# Patient Record
Sex: MALE | Race: WHITE | Employment: OTHER | URBAN - NONMETROPOLITAN AREA
[De-identification: names, ages, dates, MRNs, and addresses within clinical notes are randomized per-mention and may not be internally consistent; named-entity substitution may affect disease eponyms.]

---

## 2018-04-14 ENCOUNTER — HOSPITAL ENCOUNTER (OUTPATIENT)
Age: 68
Setting detail: SPECIMEN
Discharge: HOME | End: 2018-04-14
Payer: MEDICARE

## 2018-04-14 PROCEDURE — 89055 LEUKOCYTE ASSESSMENT FECAL: CPT

## 2018-04-14 PROCEDURE — 87045 FECES CULTURE AEROBIC BACT: CPT

## 2018-04-14 PROCEDURE — 87329 GIARDIA AG IA: CPT

## 2018-04-15 LAB — WHITE BLOOD CELLS (WBC), STOOL: NORMAL

## 2018-04-16 ENCOUNTER — HOSPITAL ENCOUNTER (OUTPATIENT)
Age: 68
Setting detail: SPECIMEN
Discharge: HOME | End: 2018-04-16
Payer: MEDICARE

## 2018-04-16 LAB
ALBUMIN SERPL-MCNC: 4.4 G/DL (ref 3.5–5.2)
ALP BLD-CCNC: 36 U/L (ref 40–129)
ALT SERPL-CCNC: 24 U/L (ref 0–40)
AMYLASE: 51 U/L (ref 20–100)
ANION GAP SERPL CALCULATED.3IONS-SCNC: 14 MMOL/L (ref 7–16)
AST SERPL-CCNC: 18 U/L (ref 0–39)
BILIRUB SERPL-MCNC: 1.2 MG/DL (ref 0–1.2)
BUN BLDV-MCNC: 22 MG/DL (ref 8–23)
CALCIUM SERPL-MCNC: 9.6 MG/DL (ref 8.6–10.2)
CHLORIDE BLD-SCNC: 103 MMOL/L (ref 98–107)
CO2: 27 MMOL/L (ref 22–29)
CREAT SERPL-MCNC: 0.9 MG/DL (ref 0.7–1.2)
GFR AFRICAN AMERICAN: >60
GFR NON-AFRICAN AMERICAN: >60 ML/MIN/1.73
GIARDIA ANTIGEN STOOL: NORMAL
GLUCOSE BLD-MCNC: 108 MG/DL (ref 74–109)
HCT VFR BLD CALC: 46.6 % (ref 37–54)
HEMOGLOBIN: 15.4 G/DL (ref 12.5–16.5)
LIPASE: 16 U/L (ref 13–60)
MCH RBC QN AUTO: 29.4 PG (ref 26–35)
MCHC RBC AUTO-ENTMCNC: 33 % (ref 32–34.5)
MCV RBC AUTO: 89.1 FL (ref 80–99.9)
PDW BLD-RTO: 13.1 FL (ref 11.5–15)
PLATELET # BLD: 244 E9/L (ref 130–450)
PMV BLD AUTO: 11.6 FL (ref 7–12)
POTASSIUM SERPL-SCNC: 4.3 MMOL/L (ref 3.5–5)
RBC # BLD: 5.23 E12/L (ref 3.8–5.8)
SODIUM BLD-SCNC: 144 MMOL/L (ref 132–146)
TOTAL PROTEIN: 7.7 G/DL (ref 6.4–8.3)
WBC # BLD: 7.4 E9/L (ref 4.5–11.5)

## 2018-04-16 PROCEDURE — 85027 COMPLETE CBC AUTOMATED: CPT

## 2018-04-16 PROCEDURE — 36415 COLL VENOUS BLD VENIPUNCTURE: CPT

## 2018-04-16 PROCEDURE — 83690 ASSAY OF LIPASE: CPT

## 2018-04-16 PROCEDURE — 80053 COMPREHEN METABOLIC PANEL: CPT

## 2018-04-16 PROCEDURE — 82150 ASSAY OF AMYLASE: CPT

## 2018-04-17 LAB — CULTURE, STOOL: NORMAL

## 2020-01-24 VITALS
BODY MASS INDEX: 24.05 KG/M2 | HEIGHT: 70 IN | DIASTOLIC BLOOD PRESSURE: 100 MMHG | SYSTOLIC BLOOD PRESSURE: 152 MMHG | HEART RATE: 52 BPM | WEIGHT: 168 LBS

## 2020-02-06 NOTE — PROGRESS NOTES
mouth 2 times daily   11    Multiple Vitamins-Minerals (PRESERVISION AREDS 2) CAPS Take 1 capsule by mouth Daily   12    hydrochlorothiazide (MICROZIDE) 12.5 MG capsule Take 12.5 mg by mouth every morning   12    esomeprazole (NEXIUM) 40 MG delayed release capsule Take 40 mg by mouth every morning (before breakfast)   12    Lidocaine-Hydrocortisone Ace (CEZAR-MICHEL) 2-2 % KIT Place rectally daily as needed      clobetasol (TEMOVATE) 0.05 % cream Apply topically 2 times daily as needed Apply topically 2 times daily.  diphenhydrAMINE (BENADRYL) 50 MG capsule Take 50 mg by mouth every 6 hours as needed for Itching       metoprolol (LOPRESSOR) 25 MG tablet Take 1 tablet by mouth 2 times daily. 60 tablet 0    tamsulosin (FLOMAX) 0.4 MG capsule Take 1 capsule by mouth daily. 30 capsule 0    loratadine (CLARITIN) 10 MG tablet Take 10 mg by mouth daily.  meloxicam (MOBIC) 15 MG tablet Take 15 mg by mouth daily.  Calcium Polycarbophil (FIBER) 625 MG TABS Take 1 tablet by mouth 2 times daily (with meals). No current facility-administered medications for this visit.           Allergies as of 2020 - Review Complete 2020   Allergen Reaction Noted    Bee venom  2015       Social History     Socioeconomic History    Marital status: Single     Spouse name: Not on file    Number of children: Not on file    Years of education: Not on file    Highest education level: Not on file   Occupational History    Not on file   Social Needs    Financial resource strain: Not on file    Food insecurity:     Worry: Not on file     Inability: Not on file    Transportation needs:     Medical: Not on file     Non-medical: Not on file   Tobacco Use    Smoking status: Former Smoker     Last attempt to quit: 1995     Years since quittin.6    Smokeless tobacco: Never Used   Substance and Sexual Activity    Alcohol use: No     Comment: No caffeine     Drug use: No    Sexual activity: Not Currently   Lifestyle    Physical activity:     Days per week: Not on file     Minutes per session: Not on file    Stress: Not on file   Relationships    Social connections:     Talks on phone: Not on file     Gets together: Not on file     Attends [employer] service: Not on file     Active member of club or organization: Not on file     Attends meetings of clubs or organizations: Not on file     Relationship status: Not on file    Intimate partner violence:     Fear of current or ex partner: Not on file     Emotionally abused: Not on file     Physically abused: Not on file     Forced sexual activity: Not on file   Other Topics Concern    Not on file   Social History Narrative    Not on file     No family history for early CAD    REVIEW OF SYSTEMS:     CONSTITUTIONAL:  negative for  fevers, chills, sweats and fatigue  HEENT:  negative for  tinnitus, earaches, nasal congestion and epistaxis  RESPIRATORY:  negative for  dry cough, cough with sputum, dyspnea, wheezing and hemoptysis  GASTROINTESTINAL:  negative for nausea, vomiting, diarrhea, constipation, pruritus and jaundice  HEMATOLOGIC/LYMPHATIC:  negative for easy bruising, bleeding, lymphadenopathy and petechiae  ENDOCRINE:  negative for heat intolerance, cold intolerance, tremor, hair loss and diabetic symptoms including neither polyuria nor polydipsia nor blurred vision  MUSCULOSKELETAL:  negative for  myalgias, arthralgias, joint swelling, stiff joints and decreased range of motion  NEUROLOGICAL:  negative for memory problems, speech problems, visual disturbance, dysphagia, weakness and numbness      PHYSICAL EXAM:   Constitutional:  Awake, alert cooperative, no apparent distress, and appears stated age. HEENT:  Moist and pink mucous membranes, normocephalic, without obvious abnormality, atraumatic, normal ears and nose.    NECK:  Supple, symmetrical, trachea midline, no JVD, no adenopathy, thyroid symmetric, not enlarged and no tenderness, good carotid Follow-up with Dr. Linda Cyr in 1 year, sooner if symptomatic for any reason    I have reviewed my findings and recommendations with patient    Electronically signed by Meche Hopper MD on 2/11/2020 at 10:31 AM  NOTE: This report was transcribed using voice recognition software.  Every effort was made to ensure accuracy; however, inadvertent computerized transcription errors may be present

## 2020-02-11 ENCOUNTER — OFFICE VISIT (OUTPATIENT)
Dept: CARDIOLOGY CLINIC | Age: 70
End: 2020-02-11
Payer: MEDICARE

## 2020-02-11 VITALS
SYSTOLIC BLOOD PRESSURE: 132 MMHG | HEIGHT: 70 IN | WEIGHT: 173 LBS | HEART RATE: 63 BPM | BODY MASS INDEX: 24.77 KG/M2 | DIASTOLIC BLOOD PRESSURE: 68 MMHG

## 2020-02-11 PROCEDURE — 93000 ELECTROCARDIOGRAM COMPLETE: CPT | Performed by: INTERNAL MEDICINE

## 2020-02-11 PROCEDURE — 99213 OFFICE O/P EST LOW 20 MIN: CPT | Performed by: INTERNAL MEDICINE

## 2020-02-11 RX ORDER — FLUTICASONE PROPIONATE 50 MCG
2 SPRAY, SUSPENSION (ML) NASAL DAILY
COMMUNITY

## 2020-02-11 RX ORDER — SUCRALFATE 1 G/1
1 TABLET ORAL 4 TIMES DAILY
COMMUNITY

## 2020-09-01 ENCOUNTER — OFFICE VISIT (OUTPATIENT)
Dept: CARDIOLOGY CLINIC | Age: 70
End: 2020-09-01
Payer: MEDICARE

## 2020-09-01 VITALS
BODY MASS INDEX: 24.48 KG/M2 | SYSTOLIC BLOOD PRESSURE: 112 MMHG | RESPIRATION RATE: 16 BRPM | HEART RATE: 75 BPM | DIASTOLIC BLOOD PRESSURE: 78 MMHG | HEIGHT: 70 IN | WEIGHT: 171 LBS

## 2020-09-01 PROCEDURE — 93000 ELECTROCARDIOGRAM COMPLETE: CPT | Performed by: INTERNAL MEDICINE

## 2020-09-01 PROCEDURE — 99214 OFFICE O/P EST MOD 30 MIN: CPT | Performed by: INTERNAL MEDICINE

## 2020-09-04 ENCOUNTER — HOSPITAL ENCOUNTER (OUTPATIENT)
Age: 70
Discharge: HOME | End: 2020-09-06
Payer: MEDICARE

## 2020-09-04 PROCEDURE — U0003 INFECTIOUS AGENT DETECTION BY NUCLEIC ACID (DNA OR RNA); SEVERE ACUTE RESPIRATORY SYNDROME CORONAVIRUS 2 (SARS-COV-2) (CORONAVIRUS DISEASE [COVID-19]), AMPLIFIED PROBE TECHNIQUE, MAKING USE OF HIGH THROUGHPUT TECHNOLOGIES AS DESCRIBED BY CMS-2020-01-R: HCPCS

## 2020-09-09 LAB
SARS-COV-2: NOT DETECTED
SOURCE: NORMAL

## 2020-09-09 NOTE — PROGRESS NOTES
explained no results of covid test yet; asked for  to come to [facility] tomorrow for a rapid test before echo.   cg verbalized understanding

## 2020-09-09 NOTE — PROGRESS NOTES
[address]                                                                                                                     PRE OP INSTRUCTIONS FOR  Sergio Dexter        Date: 9/9/2020    Date and time of surgery: 9/10/20   Arrival Time: 1000    1. Do not eat or drink anything after 12 midnight prior to surgery. This includes no water, chewing gum, mints or ice chips. 2. Take the following pills with a small sip of water on the morning of Surgery: Metoprolol      3. Diabetics may take evening dose of insulin but none after midnight. If you feel symptomatic or low blood sugar take 1-2 ounces of apple juice only. 4. Aspirin, Ibuprofen, Advil, Naproxen, Vitamin E and other Anti-inflammatory products should be stopped  before surgery  as directed by your physician. 5. Check with your Doctor regarding stopping Plavix, Coumadin, Lovenox, Fragmin or other blood thinners. 6. Do not smoke,use illicit drugs and do not drink any alcoholic beverages 24 hours prior to surgery. 7. You may brush your teeth and gargle the morning of surgery. DO NOT SWALLOW WATER    8. You MUST make arrangements for a responsible adult to take you home after your surgery. You will not be allowed to leave alone or drive yourself home. It is strongly suggested someone stay with you the first 24 hrs. Your surgery will be cancelled if you do not have a ride home. 9. A parent/legal guardian must accompany a child scheduled for surgery and plan to stay at the hospital until the child is discharged. Please do not bring other children with you. 10. Please wear simple, loose fitting clothing to the hospital.  Judy Crawford not bring valuables (money, credit cards, checkbooks, etc.) Do not wear any makeup (including no eye makeup) or nail polish on your fingers or toes. 11. DO NOT wear any jewelry or piercings on day of surgery. All body piercing jewelry must be removed. 12.  Shower the night before surgery with _X__Antibacterial soap ___Hibiclens. 15. Remember to bring Blood Bank bracelet to the hospital on the day of surgery. 14. If you have a Living Will and Durable Power of  for Healthcare, please bring in a copy. 15. If appropriate bring crutches, inspirex, etc... 12. Notify your Surgeon if you develop any illness between now and surgery time, cough, cold, fever, sore throat, nausea, vomiting, etc.  Please notify your surgeon if you experience dizziness, shortness of breath or blurred vision between now & the time of your surgery. 17. If you have _X__dentures, they will be removed before going to the OR; we will provide you a container. If you wear ___contact lenses or _X__glasses, they will be removed; please bring a case for them. 18. To provide excellent care visitors will be limited to one in the room at any given time. 19. Please bring picture ID and insurance card. 20. Sleep apnea patients need to bring CPAP to hospital on day of surgery. 21. Visit our web site for additional information: [email or web address]    22. During flu season no children under the age of 15 are permitted in the hospital for the safety of all patients. 23. Other Come in through main lobby, go to information desk. Please call pre admission testing if you have any further questions.    [facility]     [address]

## 2020-09-10 ENCOUNTER — HOSPITAL ENCOUNTER (OUTPATIENT)
Age: 70
Setting detail: OUTPATIENT SURGERY
Discharge: OTHER INSTITUTION | End: 2020-09-10
Attending: STUDENT IN AN ORGANIZED HEALTH CARE EDUCATION/TRAINING PROGRAM | Admitting: STUDENT IN AN ORGANIZED HEALTH CARE EDUCATION/TRAINING PROGRAM
Payer: MEDICARE

## 2020-09-10 ENCOUNTER — ANESTHESIA EVENT (OUTPATIENT)
Dept: ENDOSCOPY | Age: 70
End: 2020-09-10
Payer: MEDICARE

## 2020-09-10 ENCOUNTER — ANESTHESIA (OUTPATIENT)
Dept: ENDOSCOPY | Age: 70
End: 2020-09-10
Payer: MEDICARE

## 2020-09-10 VITALS
RESPIRATION RATE: 22 BRPM | DIASTOLIC BLOOD PRESSURE: 83 MMHG | SYSTOLIC BLOOD PRESSURE: 136 MMHG | OXYGEN SATURATION: 91 %

## 2020-09-10 VITALS
TEMPERATURE: 96.9 F | SYSTOLIC BLOOD PRESSURE: 156 MMHG | WEIGHT: 165 LBS | OXYGEN SATURATION: 98 % | BODY MASS INDEX: 24.44 KG/M2 | DIASTOLIC BLOOD PRESSURE: 82 MMHG | RESPIRATION RATE: 16 BRPM | HEIGHT: 69 IN | HEART RATE: 58 BPM

## 2020-09-10 LAB
LV EF: 63 %
LVEF MODALITY: NORMAL

## 2020-09-10 PROCEDURE — 6360000002 HC RX W HCPCS: Performed by: NURSE ANESTHETIST, CERTIFIED REGISTERED

## 2020-09-10 PROCEDURE — 3700000001 HC ADD 15 MINUTES (ANESTHESIA): Performed by: STUDENT IN AN ORGANIZED HEALTH CARE EDUCATION/TRAINING PROGRAM

## 2020-09-10 PROCEDURE — 7100000010 HC PHASE II RECOVERY - FIRST 15 MIN: Performed by: STUDENT IN AN ORGANIZED HEALTH CARE EDUCATION/TRAINING PROGRAM

## 2020-09-10 PROCEDURE — 2580000003 HC RX 258: Performed by: ANESTHESIOLOGY

## 2020-09-10 PROCEDURE — 93325 DOPPLER ECHO COLOR FLOW MAPG: CPT

## 2020-09-10 PROCEDURE — 93312 ECHO TRANSESOPHAGEAL: CPT | Performed by: STUDENT IN AN ORGANIZED HEALTH CARE EDUCATION/TRAINING PROGRAM

## 2020-09-10 PROCEDURE — 93312 ECHO TRANSESOPHAGEAL: CPT

## 2020-09-10 PROCEDURE — 3700000000 HC ANESTHESIA ATTENDED CARE: Performed by: STUDENT IN AN ORGANIZED HEALTH CARE EDUCATION/TRAINING PROGRAM

## 2020-09-10 PROCEDURE — 7100000011 HC PHASE II RECOVERY - ADDTL 15 MIN: Performed by: STUDENT IN AN ORGANIZED HEALTH CARE EDUCATION/TRAINING PROGRAM

## 2020-09-10 PROCEDURE — 2709999900 HC NON-CHARGEABLE SUPPLY: Performed by: STUDENT IN AN ORGANIZED HEALTH CARE EDUCATION/TRAINING PROGRAM

## 2020-09-10 RX ORDER — SODIUM CHLORIDE, SODIUM LACTATE, POTASSIUM CHLORIDE, CALCIUM CHLORIDE 600; 310; 30; 20 MG/100ML; MG/100ML; MG/100ML; MG/100ML
INJECTION, SOLUTION INTRAVENOUS CONTINUOUS
Status: DISCONTINUED | OUTPATIENT
Start: 2020-09-10 | End: 2020-09-10 | Stop reason: HOSPADM

## 2020-09-10 RX ORDER — PROPOFOL 10 MG/ML
INJECTION, EMULSION INTRAVENOUS CONTINUOUS PRN
Status: DISCONTINUED | OUTPATIENT
Start: 2020-09-10 | End: 2020-09-10 | Stop reason: SDUPTHER

## 2020-09-10 RX ADMIN — SODIUM CHLORIDE, POTASSIUM CHLORIDE, SODIUM LACTATE AND CALCIUM CHLORIDE: 600; 310; 30; 20 INJECTION, SOLUTION INTRAVENOUS at 10:05

## 2020-09-10 RX ADMIN — PROPOFOL 100 MCG/KG/MIN: 10 INJECTION, EMULSION INTRAVENOUS at 11:45

## 2020-09-10 ASSESSMENT — PAIN SCALES - GENERAL
PAINLEVEL_OUTOF10: 0

## 2020-09-10 ASSESSMENT — PAIN - FUNCTIONAL ASSESSMENT: PAIN_FUNCTIONAL_ASSESSMENT: 0-10

## 2020-09-10 NOTE — ANESTHESIA PRE PROCEDURE
Department of Anesthesiology  Preprocedure Note       Name:  Vargas Jiang   Age:  71 y.o.  :  1950                                          MRN:  [id number]         Date:  9/10/2020      Surgeon: Astrid Degree):  Maty Ba MD    Procedure: Procedure(s):  TRANSESOPHAGEAL ECHOCARDIOGRAM    Medications prior to admission:   Prior to Admission medications    Medication Sig Start Date End Date Taking? Authorizing Provider   fluticasone (FLONASE) 50 MCG/ACT nasal spray 2 sprays by Nasal route daily     Historical Provider, MD   sucralfate (CARAFATE) 1 GM tablet Take 1 g by mouth    Historical Provider, MD   CALCIUM 600/VITAMIN D3 600-800 MG-UNIT TABS Take by mouth 2 times daily  17   Historical Provider, MD   Multiple Vitamins-Minerals (PRESERVISION AREDS 2) CAPS Take 1 capsule by mouth Daily  17   Historical Provider, MD   hydrochlorothiazide (MICROZIDE) 12.5 MG capsule Take 12.5 mg by mouth every morning  17   Historical Provider, MD   esomeprazole (NEXIUM) 40 MG delayed release capsule Take 40 mg by mouth every morning (before breakfast)  17   Historical Provider, MD   Lidocaine-Hydrocortisone Ace (CEZAR-MICHEL) 2-2 % KIT Place rectally daily as needed    Historical Provider, MD   clobetasol (TEMOVATE) 0.05 % cream Apply topically 2 times daily as needed Apply topically 2 times daily. Historical Provider, MD   diphenhydrAMINE (BENADRYL) 50 MG capsule Take 50 mg by mouth every 6 hours as needed for Itching     Historical Provider, MD   metoprolol (LOPRESSOR) 25 MG tablet Take 1 tablet by mouth 2 times daily. 3/12/15   Robyn Philip DO, FACOI   tamsulosin (FLOMAX) 0.4 MG capsule Take 1 capsule by mouth daily. 3/12/15   Robyn Philip DO, FACOI   loratadine (CLARITIN) 10 MG tablet Take 10 mg by mouth daily. Historical Provider, MD   meloxicam (MOBIC) 15 MG tablet Take 15 mg by mouth daily.     Historical Provider, MD   Calcium Polycarbophil (FIBER) 625 MG TABS Take 1 tablet by mouth 2 times daily (with meals). Historical Provider, MD       Current medications:    No current facility-administered medications for this visit. No current outpatient medications on file. Facility-Administered Medications Ordered in Other Visits   Medication Dose Route Frequency Provider Last Rate Last Dose    lactated ringers infusion   Intravenous Continuous aKte Valle MD   Stopped at 09/10/20 9771       Allergies:     Allergies   Allergen Reactions    Bee Venom Swelling       Problem List:    Patient Active Problem List   Diagnosis Code    Hypertension I10    SBO (small bowel obstruction) ([facility]) K56.609    Lactic acidosis E87.2    LEIDY (acute kidney injury) ([facility] [address] N17.9    Leukocytosis D72.829    Partial small bowel obstruction (HCC) K56.600       Past Medical History:        Diagnosis Date    Abnormal electrocardiogram (ECG) (EKG)     Arthritis     BPH (benign prostatic hyperplasia)     Cardiomyopathy, unspecified (HCC)     Chronic low back pain     lumbar herniated disc    Gastritis     with erosive esophagitis    GERD (gastroesophageal reflux disease)     History of cardiovascular stress test 6/2015    lexiscan    [place] (hard of hearing)     Hypertension     Internal hemorrhoids     Macular degeneration     Mental deficiency     Mitral valve insufficiency 07/24/2020    marked    Moderate tricuspid insufficiency 07/24/2020    Osteopenia     Partial small bowel obstruction (HCC) 3/2/2015    PVC (premature ventricular contraction)     Trigeminal neuralgia     right eye    Ventricular premature depolarization        Past Surgical History:        Procedure Laterality Date    CARPAL TUNNEL RELEASE  3/16/2009    left    CHOLECYSTECTOMY  05/24/2017    lap    ENDOSCOPY, COLON, DIAGNOSTIC  4-    ANTEGRADE BALLOON ENTEROSCOPY    EYE SURGERY  12/9/2010    right eye, left 1/13/2011    OTHER SURGICAL HISTORY  03/02/2015    diagnostic laparascopy; egd with biopsey partial omentectomy       Social History:    Social History     Tobacco Use    Smoking status: Former Smoker     Types: Cigarettes     Last attempt to quit: 1995     Years since quittin.2    Smokeless tobacco: Never Used   Substance Use Topics    Alcohol use: No     Comment: No caffeine                                 Counseling given: Not Answered      Vital Signs (Current): There were no vitals filed for this visit. BP Readings from Last 3 Encounters:   09/10/20 (!) 140/80   09/10/20 136/83   20 112/78       NPO Status:                                                                                 BMI:   Wt Readings from Last 3 Encounters:   09/10/20 165 lb (74.8 kg)   20 171 lb (77.6 kg)   20 173 lb (78.5 kg)     There is no height or weight on file to calculate BMI.    CBC:   Lab Results   Component Value Date    WBC 7.4 2018    RBC 5.23 2018    HGB 15.4 2018    HCT 46.6 2018    MCV 89.1 2018    RDW 13.1 2018     2018       CMP:   Lab Results   Component Value Date     2018    K 4.3 2018     2018    CO2 27 2018    BUN 22 2018    CREATININE 0.9 2018    GFRAA >60 2018    LABGLOM >60 2018    GLUCOSE 108 2018    PROT 7.7 2018    CALCIUM 9.6 2018    BILITOT 1.2 2018    ALKPHOS 36 2018    AST 18 2018    ALT 24 2018       POC Tests: No results for input(s): POCGLU, POCNA, POCK, POCCL, POCBUN, POCHEMO, POCHCT in the last 72 hours.     Coags: No results found for: PROTIME, INR, APTT    HCG (If Applicable): No results found for: PREGTESTUR, PREGSERUM, HCG, HCGQUANT     ABGs: No results found for: PHART, PO2ART, EBS7XXH, KAY6RGK, BEART, Z2FQQPZW     Type & Screen (If Applicable):  No results found for: LABABO, LABRH    Drug/Infectious Status (If Applicable):  No results found for: HIV, HEPCAB    COVID-19 Screening (If Applicable):   Lab Results   Component Value Date    COVID19 Not Detected 09/04/2020         Anesthesia Evaluation  Patient summary reviewed  Airway: Mallampati: Unable to assess / NA        Dental:          Pulmonary:Negative Pulmonary ROS and normal exam  breath sounds clear to auscultation                             Cardiovascular:    (+) hypertension:, valvular problems/murmurs (Mod Tricuspid [place]urge. ): MR, dysrhythmias (PVC):,       ECG reviewed  Rhythm: regular  Rate: normal  Echocardiogram reviewed         Beta Blocker:  Dose within 24 Hrs      ROS comment: Unspecified Cardiomyopathy. EKG: Sinus Rhythm 75. ECHO:  Mild MR. Mild left atrial abnormality. EF 44 %. Left Ventricular Diastolic Dysfunction. Neuro/Psych:   (+) neuromuscular disease:, psychiatric history:             ROS comment: Chronic Low Back Problems  Osteopenia. GI/Hepatic/Renal:   (+) GERD:,          ROS comment: Gastritis. Benign prostatic hypertrophy  H/O Partial small bowel obstruction. .   Endo/Other:    (+) : arthritis:., .                  ROS comment: Macular Degeneration. Internal Hemorrhoids. Hard of Hearing   Abdominal:           Vascular: negative vascular ROS. Anesthesia Plan      MAC     ASA 3       Induction: intravenous. Anesthetic plan and risks discussed with patient. Plan discussed with CRNA.     Attending anesthesiologist reviewed and agrees with Chino Iniguez MD   9/10/2020

## 2020-09-10 NOTE — PROGRESS NOTES
Spoke with Ethel in endo. Need orders. Message sent per perfect serve. Brandon Vasquez  10:11 AM  9/10/2020  Report called to Jase posada , nurse at [facility].   Brandon Vasquez 9/10/2020  2:06 PM

## 2020-09-10 NOTE — PROGRESS NOTES
[facility] Cardiology Progress Note  Dr. Carmen Parson      Referring Physician: Chaya Harada, DO  CHIEF COMPLAINT:   Chief Complaint   Patient presents with    Cardiac Valve Problem     needs seen sooner per Dr. Dorothea Merritt for abn echo-Pt offers no complaints today. HISTORY OF PRESENT ILLNESS:   Patient is 71year old male with history of PVCs, abnormal EKG and hypertension, patient had a recent echocardiogram by Dr. Dorothea Merritt office, was found to have marked mitral valve regurgitation compared to mild mitral valve regurgitation and an echocardiogram done last year, patient is here for further evaluation    Patient denies any chest pain, no shortness of breath, no lightheadedness, no dizziness, no palpitations, no pedal edema, no PND, no orthopnea, no syncope, no presyncopal episodes.   Functional capacity is good for age       Past Medical History:   Diagnosis Date    Abnormal electrocardiogram (ECG) (EKG)     Arthritis     BPH (benign prostatic hyperplasia)     Cardiomyopathy, unspecified (HCC)     Chronic low back pain     lumbar herniated disc    Gastritis     with erosive esophagitis    GERD (gastroesophageal reflux disease)     History of cardiovascular stress test 6/2015    lexiscan    [place] (hard of hearing)     Hypertension     Internal hemorrhoids     Macular degeneration     Mental deficiency     Mitral valve insufficiency 07/24/2020    marked    Moderate tricuspid insufficiency 07/24/2020    Osteopenia     Partial small bowel obstruction (HCC) 3/2/2015    PVC (premature ventricular contraction)     Trigeminal neuralgia     right eye    Ventricular premature depolarization          Past Surgical History:   Procedure Laterality Date    CARPAL TUNNEL RELEASE  3/16/2009    left    CHOLECYSTECTOMY  05/24/2017    lap    ENDOSCOPY, COLON, DIAGNOSTIC  4-    ANTEGRADE BALLOON ENTEROSCOPY    EYE SURGERY  12/9/2010    right eye, left 1/13/2011    OTHER SURGICAL HISTORY  03/02/2015 diagnostic laparascopy; egd with biopsey partial omentectomy         No current facility-administered medications for this visit. No current outpatient medications on file.      Facility-Administered Medications Ordered in Other Visits   Medication Dose Route Frequency Provider Last Rate Last Dose    lactated ringers infusion   Intravenous Continuous Tegan Rojas MD 50 mL/hr at 09/10/20 1005           Allergies as of 2020 - Review Complete 2020   Allergen Reaction Noted    Bee venom  2015       Social History     Socioeconomic History    Marital status: Single     Spouse name: Not on file    Number of children: Not on file    Years of education: Not on file    Highest education level: Not on file   Occupational History    Not on file   Social Needs    Financial resource strain: Not on file    Food insecurity     Worry: Not on file     Inability: Not on file   [employer] needs     Medical: Not on file     Non-medical: Not on file   Tobacco Use    Smoking status: Former Smoker     Types: Cigarettes     Last attempt to quit: 1995     Years since quittin.2    Smokeless tobacco: Never Used   Substance and Sexual Activity    Alcohol use: No     Comment: No caffeine     Drug use: No    Sexual activity: Not Currently   Lifestyle    Physical activity     Days per week: Not on file     Minutes per session: Not on file    Stress: Not on file   Relationships    Social connections     Talks on phone: Not on file     Gets together: Not on file     Attends Gnosticist service: Not on file     Active member of club or organization: Not on file     Attends meetings of clubs or organizations: Not on file     Relationship status: Not on file    Intimate partner violence     Fear of current or ex partner: Not on file     Emotionally abused: Not on file     Physically abused: Not on file     Forced sexual activity: Not on file   Other Topics Concern    Not on file   Social of the joints. Neurological: Alert, awake. SKIN: No bruises, no bleeding, normal skin color, texture, turgor and no redness, warmth or swelling. /78   Pulse 75   Resp 16   Ht 5' 10\" (1.778 m)   Wt 171 lb (77.6 kg)   BMI 24.54 kg/m²     DATA:  I personally reviewed the visit EKG with the following interpretation: Sinus rhythm, nonspecific T wave changes, occasional PVCs        EKG - (2/1/2019) I personally reviewed the EKG with the following interpretation: Sinus bradycardia, occasional PVCs, nonspecific ST-T changes        ECHO: Dated 8/20/2019 revealed mild mitral and tricuspid insufficiency, mild aortic and pulmonary insufficiency, mild sclerosis of the aortic valve without stenosis, ejection fraction of 57%    11/26/18 Mild mitral valve insufficiency. Mild left atrial enlargement. EF 44% with mild global hypokinesis. Left ventricular diastolic dysfunction    Stress Test: 2/7/19 1. Negative Lexiscan stress test for ischemic symptoms or ischemic EKG changes  2. Abnormal Cardiolite perfusion scan showing severely depressed left ventricular systolic function without any evidence of fixed or reversible defect  3. Severely depressed left ventricular systolic function with global wall hypokinesis  4. There is no comparison study  5.   There is also the study predicts low probability for significant coronary artery disease    Cardiology Labs: BMP:    Lab Results   Component Value Date     04/16/2018    K 4.3 04/16/2018     04/16/2018    CO2 27 04/16/2018    BUN 22 04/16/2018    CREATININE 0.9 04/16/2018     CMP:    Lab Results   Component Value Date     04/16/2018    K 4.3 04/16/2018     04/16/2018    CO2 27 04/16/2018    BUN 22 04/16/2018    CREATININE 0.9 04/16/2018    PROT 7.7 04/16/2018     CBC:    Lab Results   Component Value Date    WBC 7.4 04/16/2018    RBC 5.23 04/16/2018    HGB 15.4 04/16/2018    HCT 46.6 04/16/2018    MCV 89.1 04/16/2018    RDW 13.1 04/16/2018  04/16/2018     PT/INR:  No results found for: PTINR  PT/INR Warfarin:  No components found for: PTPATWAR, PTINRWAR  PTT:  No results found for: APTT  PTT Heparin:  No components found for: APTTHEP  Magnesium:    Lab Results   Component Value Date    MG 2.4 03/02/2015     TSH:    Lab Results   Component Value Date    TSH 1.500 05/19/2017     TROPONIN:  No components found for: TROP  BNP:  No results found for: BNP  FASTING LIPID PANEL:    Lab Results   Component Value Date    CHOL 157 05/19/2017    HDL 47 05/19/2017    TRIG 76 05/19/2017     No orders to display     I have personally reviewed the laboratory, cardiac diagnostic and radiographic testing as outlined above:      IMPRESSION:  1. Mitral valve regurgitation: Severe on echocardiogram done in July 2020, was mild on an echocardiogram done a year earlier,?,  Will schedule for transesophageal echocardiogram for further evaluation. 2.   Essential (primary) hypertension: Controlled  3. Ventricular premature depolarization: Chronic  4. Abnormal electrocardiogram EKG: Nonspecific T wave changes  5. Mild aortic valve regurgitation  6. Moderate tricuspid valve regurgitation: Compared to a mild a year ago? RECOMMENDATIONS:   1. Transesophageal echocardiogram  procedure, alternatives, benefits, risks including but not limited to sore throat, dental injury, pharyngeal injury, esophageal tear/rupture, side effects or allergy to medications, patient is willing to proceed, will schedule. 2.  Continue current treatment  3. Preventive cardiology: Low-salt, low-cholesterol diet, daily exercise, were all advised. 4.  Follow-up with Dr. Casper Bui as scheduled  5. Follow-up with Dr. Raj Dean in 1 year, sooner if symptomatic for any reason    I have reviewed my findings and recommendations with patient    Electronically signed by Arcenio Thornton MD on 9/10/2020 at 10:42 AM   NOTE: This report was transcribed using voice recognition software.  Every effort was

## 2020-09-10 NOTE — ANESTHESIA PRE PROCEDURE
Department of Anesthesiology  Preprocedure Note       Name:  Teto Olsen   Age:  71 y.o.  :  1950                                          MRN:  [id number]         Date:  9/10/2020      Surgeon: Nilay Sanchez):  Bonita Rose MD    Procedure: Procedure(s):  TRANSESOPHAGEAL ECHOCARDIOGRAM    Medications prior to admission:   Prior to Admission medications    Medication Sig Start Date End Date Taking? Authorizing Provider   fluticasone (FLONASE) 50 MCG/ACT nasal spray 2 sprays by Nasal route daily    Yes Historical Provider, MD   sucralfate (CARAFATE) 1 GM tablet Take 1 g by mouth   Yes Historical Provider, MD   CALCIUM 600/VITAMIN D3 600-800 MG-UNIT TABS Take by mouth 2 times daily  17  Yes Historical Provider, MD   Multiple Vitamins-Minerals (PRESERVISION AREDS 2) CAPS Take 1 capsule by mouth Daily  17  Yes Historical Provider, MD   hydrochlorothiazide (MICROZIDE) 12.5 MG capsule Take 12.5 mg by mouth every morning  17  Yes Historical Provider, MD   esomeprazole (NEXIUM) 40 MG delayed release capsule Take 40 mg by mouth every morning (before breakfast)  17  Yes Historical Provider, MD   Lidocaine-Hydrocortisone Ace (CEZAR-MICHEL) 2-2 % KIT Place rectally daily as needed   Yes Historical Provider, MD   clobetasol (TEMOVATE) 0.05 % cream Apply topically 2 times daily as needed Apply topically 2 times daily. Yes Historical Provider, MD   diphenhydrAMINE (BENADRYL) 50 MG capsule Take 50 mg by mouth every 6 hours as needed for Itching    Yes Historical Provider, MD   metoprolol (LOPRESSOR) 25 MG tablet Take 1 tablet by mouth 2 times daily. 3/12/15  Yes Michelle Schmidt DO, FACOI   tamsulosin (FLOMAX) 0.4 MG capsule Take 1 capsule by mouth daily. 3/12/15  Yes Michelle Schmidt DO, FACOI   loratadine (CLARITIN) 10 MG tablet Take 10 mg by mouth daily. Yes Historical Provider, MD   meloxicam (MOBIC) 15 MG tablet Take 15 mg by mouth daily.    Yes Historical Provider, MD   Calcium Polycarbophil (FIBER) 625 MG TABS Take 1 tablet by mouth 2 times daily (with meals). Yes Historical Provider, MD       Current medications:    No current facility-administered medications for this encounter. Current Outpatient Medications   Medication Sig Dispense Refill    fluticasone (FLONASE) 50 MCG/ACT nasal spray 2 sprays by Nasal route daily       sucralfate (CARAFATE) 1 GM tablet Take 1 g by mouth      CALCIUM 600/VITAMIN D3 600-800 MG-UNIT TABS Take by mouth 2 times daily   11    Multiple Vitamins-Minerals (PRESERVISION AREDS 2) CAPS Take 1 capsule by mouth Daily   12    hydrochlorothiazide (MICROZIDE) 12.5 MG capsule Take 12.5 mg by mouth every morning   12    esomeprazole (NEXIUM) 40 MG delayed release capsule Take 40 mg by mouth every morning (before breakfast)   12    Lidocaine-Hydrocortisone Ace (CEZAR-MICHEL) 2-2 % KIT Place rectally daily as needed      clobetasol (TEMOVATE) 0.05 % cream Apply topically 2 times daily as needed Apply topically 2 times daily.  diphenhydrAMINE (BENADRYL) 50 MG capsule Take 50 mg by mouth every 6 hours as needed for Itching       metoprolol (LOPRESSOR) 25 MG tablet Take 1 tablet by mouth 2 times daily. 60 tablet 0    tamsulosin (FLOMAX) 0.4 MG capsule Take 1 capsule by mouth daily. 30 capsule 0    loratadine (CLARITIN) 10 MG tablet Take 10 mg by mouth daily.  meloxicam (MOBIC) 15 MG tablet Take 15 mg by mouth daily.  Calcium Polycarbophil (FIBER) 625 MG TABS Take 1 tablet by mouth 2 times daily (with meals). Allergies:     Allergies   Allergen Reactions    Bee Venom        Problem List:    Patient Active Problem List   Diagnosis Code    Hypertension I10    SBO (small bowel obstruction) ([facility] K56.609    Lactic acidosis E87.2    LEIDY (acute kidney injury) ([email or web address] N17.9    Leukocytosis D72.829    Partial small bowel obstruction (HCC) K56.600       Past Medical History:        Diagnosis Date    Abnormal electrocardiogram (ECG) (EKG)     Arthritis  BPH (benign prostatic hyperplasia)     Cardiomyopathy, unspecified ([facility]     Chronic low back pain     lumbar herniated disc    Gastritis     with erosive esophagitis    GERD (gastroesophageal reflux disease)     History of cardiovascular stress test 2015    lexiscan    [place] (hard of hearing)     Hypertension     Internal hemorrhoids     Macular degeneration     Mental deficiency     Mitral valve insufficiency 2020    marked    Moderate tricuspid insufficiency 2020    Osteopenia     Partial small bowel obstruction (HCC) 3/2/2015    PVC (premature ventricular contraction)     Trigeminal neuralgia     right eye    Ventricular premature depolarization        Past Surgical History:        Procedure Laterality Date    CARPAL TUNNEL RELEASE  3/16/2009    left    CHOLECYSTECTOMY  2017    lap    ENDOSCOPY, COLON, DIAGNOSTIC  2015    ANTEGRADE BALLOON ENTEROSCOPY    EYE SURGERY  2010    right eye, left 2011    OTHER SURGICAL HISTORY  2015    diagnostic laparascopy; egd with biopsey partial omentectomy       Social History:    Social History     Tobacco Use    Smoking status: Former Smoker     Types: Cigarettes     Last attempt to quit: 1995     Years since quittin.2    Smokeless tobacco: Never Used   Substance Use Topics    Alcohol use: No     Comment: No caffeine                                 Counseling given: Not Answered      Vital Signs (Current):   Vitals:    20 0947   Weight: 166 lb 9.6 oz (75.6 kg)   Height: 5' 9\" (1.753 m)                                              BP Readings from Last 3 Encounters:   20 112/78   20 132/68   19 (!) 152/100       NPO Status:                                                                                 BMI:   Wt Readings from Last 3 Encounters:   20 171 lb (77.6 kg)   20 173 lb (78.5 kg)   19 168 lb (76.2 kg)     Body mass index is 24.6 kg/m².     CBC:   Lab obstruction. .   Endo/Other:    (+) : arthritis:., .                  ROS comment: Macular Degeneration. Internal Hemorrhoids. Hard of Hearing   Abdominal:           Vascular: negative vascular ROS. Anesthesia Plan      MAC     ASA 3       Induction: intravenous. Anesthetic plan and risks discussed with patient. Plan discussed with CRNA.     Attending anesthesiologist reviewed and agrees with Penelope Peng MD   9/10/2020

## 2020-09-10 NOTE — ANESTHESIA POSTPROCEDURE EVALUATION
Department of Anesthesiology  Postprocedure Note    Patient: Sergio Dexter  MRN: [id number]  YOB: 1950  Date of evaluation: 9/10/2020  Time:  1:01 PM     Procedure Summary     Date:  09/10/20 Room / Location:  [address] [place] [address] 01 / [address] [facility], [place], operated by [facility]    Anesthesia Start:  1140 Anesthesia Stop:  1200    Procedure:  TRANSESOPHAGEAL ECHOCARDIOGRAM (N/A ) Diagnosis: (MR)    Surgeon:  Sukumar Anderson MD Responsible Provider:  Lizzie Cuenca MD    Anesthesia Type:  MAC ASA Status:  3          Anesthesia Type: MAC    Yesenia Phase I: Yesenia Score: 10    Yesenia Phase II: Yesenia Score: 10    Last vitals: Reviewed and per EMR flowsheets.        Anesthesia Post Evaluation    Patient location during evaluation: PACU  Patient participation: complete - patient participated  Level of consciousness: awake  Pain score: 0  Airway patency: patent  Nausea & Vomiting: no nausea  Complications: no  Cardiovascular status: blood pressure returned to baseline  Respiratory status: acceptable  Hydration status: euvolemic

## 2020-09-17 NOTE — H&P
OUTPATIENT [facility]O    Name: Teto Olsen    Age: 71 y.o. Date of Service: 9/17/2020    Referring Physician: Bailey Molina DO    History of Present Illness:  71year old male presents for evaluation of MR. Past Medical History:  Past Medical History:   Diagnosis Date    Abnormal electrocardiogram (ECG) (EKG)     Arthritis     BPH (benign prostatic hyperplasia)     Cardiomyopathy, unspecified (HCC)     Chronic low back pain     lumbar herniated disc    Gastritis     with erosive esophagitis    GERD (gastroesophageal reflux disease)     History of cardiovascular stress test 6/2015    lexiscan    [place] (hard of hearing)     Hypertension     Internal hemorrhoids     Macular degeneration     Mental deficiency     Mitral valve insufficiency 07/24/2020    marked    Moderate tricuspid insufficiency 07/24/2020    Osteopenia     Partial small bowel obstruction (HCC) 3/2/2015    PVC (premature ventricular contraction)     Trigeminal neuralgia     right eye    Ventricular premature depolarization        Past Surgical History:  Past Surgical History:   Procedure Laterality Date    CARPAL TUNNEL RELEASE  3/16/2009    left    CHOLECYSTECTOMY  05/24/2017    lap    ENDOSCOPY, COLON, DIAGNOSTIC  4-    ANTEGRADE BALLOON ENTEROSCOPY    EYE SURGERY  12/9/2010    right eye, left 1/13/2011    OTHER SURGICAL HISTORY  03/02/2015    diagnostic laparascopy; egd with biopsey partial omentectomy    TRANSESOPHAGEAL ECHOCARDIOGRAM N/A 9/10/2020    TRANSESOPHAGEAL ECHOCARDIOGRAM performed by Bonita Rose MD at [place] [address] History:  History reviewed. No pertinent family history.     Social History:  Social History     Socioeconomic History    Marital status: Single     Spouse name: Not on file    Number of children: Not on file    Years of education: Not on file    Highest education level: Not on file   Occupational History    Not on file   Social Needs    Financial resource strain: Not on file    Food insecurity     Worry: Not on file     Inability: Not on file    Transportation needs     Medical: Not on file     Non-medical: Not on file   Tobacco Use    Smoking status: Former Smoker     Types: Cigarettes     Last attempt to quit: 1995     Years since quittin.2    Smokeless tobacco: Never Used   Substance and Sexual Activity    Alcohol use: No     Comment: No caffeine     Drug use: No    Sexual activity: Not Currently   Lifestyle    Physical activity     Days per week: Not on file     Minutes per session: Not on file    Stress: Not on file   Relationships    Social connections     Talks on phone: Not on file     Gets together: Not on file     Attends Yarsanism service: Not on file     Active member of club or organization: Not on file     Attends meetings of clubs or organizations: Not on file     Relationship status: Not on file    Intimate partner violence     Fear of current or ex partner: Not on file     Emotionally abused: Not on file     Physically abused: Not on file     Forced sexual activity: Not on file   Other Topics Concern    Not on file   Social History Narrative    Not on file       Allergies: Allergies   Allergen Reactions    Bee Venom Swelling       Current Medications:  No current facility-administered medications for this encounter.       Current Outpatient Medications   Medication Sig Dispense Refill    fluticasone (FLONASE) 50 MCG/ACT nasal spray 2 sprays by Nasal route daily       sucralfate (CARAFATE) 1 GM tablet Take 1 g by mouth      CALCIUM 600/VITAMIN D3 600-800 MG-UNIT TABS Take by mouth 2 times daily   11    Multiple Vitamins-Minerals (PRESERVISION AREDS 2) CAPS Take 1 capsule by mouth Daily   12    hydrochlorothiazide (MICROZIDE) 12.5 MG capsule Take 12.5 mg by mouth every morning   12    esomeprazole (NEXIUM) 40 MG delayed release capsule Take 40 mg by mouth every morning (before breakfast)   12    Lidocaine-Hydrocortisone Ace (CEZAR-MICHEL) 2-2 % KIT Place rectally daily as needed      clobetasol (TEMOVATE) 0.05 % cream Apply topically 2 times daily as needed Apply topically 2 times daily.  diphenhydrAMINE (BENADRYL) 50 MG capsule Take 50 mg by mouth every 6 hours as needed for Itching       metoprolol (LOPRESSOR) 25 MG tablet Take 1 tablet by mouth 2 times daily. 60 tablet 0    tamsulosin (FLOMAX) 0.4 MG capsule Take 1 capsule by mouth daily. 30 capsule 0    loratadine (CLARITIN) 10 MG tablet Take 10 mg by mouth daily.  meloxicam (MOBIC) 15 MG tablet Take 15 mg by mouth daily.  Calcium Polycarbophil (FIBER) 625 MG TABS Take 1 tablet by mouth 2 times daily (with meals). Physical Exam:  BP (!) 156/82   Pulse 58   Temp 96.9 °F (36.1 °C) (Temporal)   Resp 16   Ht 5' 9\" (1.753 m)   Wt 165 lb (74.8 kg)   SpO2 98%   BMI 24.37 kg/m²   Wt Readings from Last 3 Encounters:   09/10/20 165 lb (74.8 kg)   09/01/20 171 lb (77.6 kg)   02/11/20 173 lb (78.5 kg)     Appearance: Awake, alert, no acute respiratory distress  Skin: Intact, no rash  Head: Normocephalic, atraumatic  Eyes: EOMI, no conjunctival erythema  Neck: Supple, no elevated JVP, no carotid bruits  Lungs: Clear to auscultation bilaterally. No wheezes, rales, or rhonchi.   Cardiac: Regular rate and rhythm, +S1S2, no murmurs apparent  Abdomen: Soft, nontender, +bowel sounds  Extremities: Moves all extremities x 4, no lower extremity edema  Neurologic: No focal motor deficits apparent, normal mood and affect  Peripheral Pulses: Intact posterior tibial pulses bilaterally    Laboratory Tests:  Lab Results   Component Value Date    CREATININE 0.9 04/16/2018    BUN 22 04/16/2018     04/16/2018    K 4.3 04/16/2018     04/16/2018    CO2 27 04/16/2018     Lab Results   Component Value Date    MG 2.4 03/02/2015     Lab Results   Component Value Date    WBC 7.4 04/16/2018    HGB 15.4 04/16/2018    HCT 46.6 04/16/2018    MCV 89.1 04/16/2018

## 2021-11-11 ENCOUNTER — PROCEDURE VISIT (OUTPATIENT)
Dept: AUDIOLOGY | Age: 71
End: 2021-11-11

## 2021-11-11 DIAGNOSIS — H90.A22 SENSORINEURAL HEARING LOSS, UNILATERAL, LEFT EAR, WITH RESTRICTED HEARING ON THE CONTRALATERAL SIDE: ICD-10-CM

## 2021-11-11 PROCEDURE — 99999 PR OFFICE/OUTPT VISIT,PROCEDURE ONLY: CPT | Performed by: AUDIOLOGIST

## 2021-11-11 NOTE — PROGRESS NOTES
Ear mold impression of left ear taken without incident. Will contact facility when hearing aid/ear mold arrives.

## 2021-12-14 ENCOUNTER — OFFICE VISIT (OUTPATIENT)
Dept: CARDIOLOGY CLINIC | Age: 71
End: 2021-12-14
Payer: MEDICARE

## 2021-12-14 VITALS
HEIGHT: 69 IN | RESPIRATION RATE: 16 BRPM | SYSTOLIC BLOOD PRESSURE: 130 MMHG | HEART RATE: 70 BPM | WEIGHT: 178 LBS | BODY MASS INDEX: 26.36 KG/M2 | OXYGEN SATURATION: 100 % | DIASTOLIC BLOOD PRESSURE: 80 MMHG

## 2021-12-14 DIAGNOSIS — I38 VHD (VALVULAR HEART DISEASE): Primary | ICD-10-CM

## 2021-12-14 PROCEDURE — G8427 DOCREV CUR MEDS BY ELIG CLIN: HCPCS | Performed by: INTERNAL MEDICINE

## 2021-12-14 PROCEDURE — 1123F ACP DISCUSS/DSCN MKR DOCD: CPT | Performed by: INTERNAL MEDICINE

## 2021-12-14 PROCEDURE — 99213 OFFICE O/P EST LOW 20 MIN: CPT | Performed by: INTERNAL MEDICINE

## 2021-12-14 PROCEDURE — G8417 CALC BMI ABV UP PARAM F/U: HCPCS | Performed by: INTERNAL MEDICINE

## 2021-12-14 PROCEDURE — 4040F PNEUMOC VAC/ADMIN/RCVD: CPT | Performed by: INTERNAL MEDICINE

## 2021-12-14 PROCEDURE — G8484 FLU IMMUNIZE NO ADMIN: HCPCS | Performed by: INTERNAL MEDICINE

## 2021-12-14 PROCEDURE — 1036F TOBACCO NON-USER: CPT | Performed by: INTERNAL MEDICINE

## 2021-12-14 PROCEDURE — 3017F COLORECTAL CA SCREEN DOC REV: CPT | Performed by: INTERNAL MEDICINE

## 2021-12-14 PROCEDURE — 93000 ELECTROCARDIOGRAM COMPLETE: CPT | Performed by: INTERNAL MEDICINE

## 2021-12-14 RX ORDER — HYDROCORTISONE 25 MG/G
CREAM TOPICAL 2 TIMES DAILY
COMMUNITY

## 2021-12-14 NOTE — PROGRESS NOTES
[facility] Cardiology Progress Note  Dr. Connor Stephens      Referring Physician: Henrry Denson DO  CHIEF COMPLAINT:   Chief Complaint   Patient presents with    Cardiac Valve Problem     15 mo ov-no c/o        HISTORY OF PRESENT ILLNESS:   Patient is 9year old male with history of mitral valve regurgitation, PVCs, abnormal EKG and hypertension, patient had a recent echocardiogram by Dr. Madelin Starkey office, was found to have marked mitral valve regurgitation compared to mild mitral valve regurgitation and an echocardiogram done last year, patient is here for further evaluation    Patient denies any chest pain, no shortness of breath, no lightheadedness, no dizziness, no palpitations, no pedal edema, no PND, no orthopnea, no syncope, no presyncopal episodes.   Functional capacity is good for age       Past Medical History:   Diagnosis Date    Abnormal electrocardiogram (ECG) (EKG)     Arthritis     BPH (benign prostatic hyperplasia)     Cardiomyopathy, unspecified (HCC)     Chronic low back pain     lumbar herniated disc    Gastritis     with erosive esophagitis    GERD (gastroesophageal reflux disease)     History of cardiovascular stress test 6/2015    lexiscan    [place] (hard of hearing)     Hypertension     Internal hemorrhoids     Macular degeneration     Mental deficiency     Mitral valve insufficiency 07/24/2020    marked    Moderate tricuspid insufficiency 07/24/2020    Osteopenia     Partial small bowel obstruction (HCC) 3/2/2015    PVC (premature ventricular contraction)     Trigeminal neuralgia     right eye    Ventricular premature depolarization          Past Surgical History:   Procedure Laterality Date    CARPAL TUNNEL RELEASE  3/16/2009    left    CHOLECYSTECTOMY  05/24/2017    lap    ENDOSCOPY, COLON, DIAGNOSTIC  4-    ANTEGRADE BALLOON ENTEROSCOPY    EYE SURGERY  12/9/2010    right eye, left 1/13/2011    OTHER SURGICAL HISTORY  03/02/2015    diagnostic laparascopy; egd with biopsey partial omentectomy    TRANSESOPHAGEAL ECHOCARDIOGRAM N/A 9/10/2020    TRANSESOPHAGEAL ECHOCARDIOGRAM performed by Margaret Yancey MD at [place] [id number]         Current Outpatient Medications   Medication Sig Dispense Refill    hydrocortisone (ANUSOL-HC) 2.5 % CREA rectal cream Place rectally 2 times daily      fluticasone (FLONASE) 50 MCG/ACT nasal spray 2 sprays by Nasal route daily       sucralfate (CARAFATE) 1 GM tablet Take 1 g by mouth 4 times daily       CALCIUM 600/VITAMIN D3 600-800 MG-UNIT TABS Take by mouth 2 times daily   11    Multiple Vitamins-Minerals (PRESERVISION AREDS 2) CAPS Take 1 capsule by mouth Daily   12    hydrochlorothiazide (MICROZIDE) 12.5 MG capsule Take 12.5 mg by mouth every morning   12    esomeprazole (NEXIUM) 40 MG delayed release capsule Take 40 mg by mouth every morning (before breakfast)   12    Lidocaine-Hydrocortisone Ace (CEZAR-MICHEL) 2-2 % KIT Place rectally daily as needed      clobetasol (TEMOVATE) 0.05 % cream Apply topically 2 times daily as needed Apply topically 2 times daily.  diphenhydrAMINE (BENADRYL) 50 MG capsule Take 50 mg by mouth every 6 hours as needed for Itching       metoprolol (LOPRESSOR) 25 MG tablet Take 1 tablet by mouth 2 times daily. 60 tablet 0    tamsulosin (FLOMAX) 0.4 MG capsule Take 1 capsule by mouth daily. 30 capsule 0    loratadine (CLARITIN) 10 MG tablet Take 10 mg by mouth daily.  meloxicam (MOBIC) 15 MG tablet Take 15 mg by mouth daily.  Calcium Polycarbophil (FIBER) 625 MG TABS Take 1 tablet by mouth 2 times daily (with meals). No current facility-administered medications for this visit.          Allergies as of 12/14/2021 - Fully Reviewed 12/14/2021   Allergen Reaction Noted    Bee venom Swelling 03/02/2015       Social History     Socioeconomic History    Marital status: Single     Spouse name: Not on file    Number of children: Not on file    Years of education: Not on file    Highest education level: Not on file   Occupational History    Not on file   Tobacco Use    Smoking status: Former Smoker     Types: Cigarettes     Quit date: 1995     Years since quittin.4    Smokeless tobacco: Never Used   Vaping Use    Vaping Use: Never used   Substance and Sexual Activity    Alcohol use: No     Comment: No caffeine     Drug use: No    Sexual activity: Not Currently   Other Topics Concern    Not on file   Social History Narrative    Not on file     Social Determinants of Health     Financial Resource Strain:     Difficulty of Paying Living Expenses: Not on file   Food Insecurity:     Worried About Running Out of Food in the Last Year: Not on file    Yulissa of Food in the Last Year: Not on file   Transportation Needs:     Lack of Transportation (Medical): Not on file    Lack of Transportation (Non-Medical):  Not on file   Physical Activity:     Days of Exercise per Week: Not on file    Minutes of Exercise per Session: Not on file   Stress:     Feeling of Stress : Not on file   Social Connections:     Frequency of Communication with Friends and Family: Not on file    Frequency of Social Gatherings with Friends and Family: Not on file    Attends [employer] Services: Not on file    Active Member of [address] [place] [address] or Organizations: Not on file    Attends Club or Organization Meetings: Not on file    Marital Status: Not on file   Intimate Partner Violence:     Fear of Current or Ex-Partner: Not on file    Emotionally Abused: Not on file    Physically Abused: Not on file    Sexually Abused: Not on file   Housing Stability:     Unable to Pay for Housing in the Last Year: Not on file    Number of [place] in the Last Year: Not on file    Unstable Housing in the Last Year: Not on file     No family history for early CAD    REVIEW OF SYSTEMS:     CONSTITUTIONAL:  negative for  fevers, chills, sweats and fatigue  HEENT:  negative for  tinnitus, earaches, nasal congestion and epistaxis  RESPIRATORY:  negative for  dry cough, cough with sputum, wheezing and hemoptysis  GASTROINTESTINAL:  negative for nausea, vomiting, diarrhea, constipation, pruritus and jaundice  HEMATOLOGIC/LYMPHATIC:  negative for easy bruising, bleeding, lymphadenopathy and petechiae  ENDOCRINE:  negative for heat intolerance, cold intolerance, tremor, hair loss and diabetic symptoms including neither polyuria nor polydipsia nor blurred vision  MUSCULOSKELETAL:  negative for  myalgias, arthralgias, joint swelling, stiff joints and decreased range of motion  NEUROLOGICAL:  negative for memory problems, speech problems, visual disturbance, dysphagia, weakness and numbness      PHYSICAL EXAM:   Constitutional:  Awake, alert cooperative, no apparent distress, and appears stated age. HEENT:  Moist and pink mucous membranes, normocephalic, without obvious abnormality, atraumatic, normal ears and nose. NECK:  Supple, symmetrical, trachea midline, no JVD, no adenopathy, thyroid symmetric, not enlarged and no tenderness, good carotid upstroke bilaterally, no carotid bruit, skin normal.   LUNGS: No increased work of breathing, good air exchange, clear to auscultation bilaterally, no crackles or wheezing. Cardiovascular: Normal apical impulse, regular rate and rhythm, normal S1 and S2, no S3 or S4, 2/6 systolic murmur at the apex, 2/6 systolic murmur at the left lower sternal border, no pedal edema, good carotid upstroke bilaterally, no carotid bruit, no JVD, no abdominal pulsating masses. ABDOMEN: Soft, nontender, no hepatomegaly, no splenomegaly, bowel sound positive. CHEST:  Expands symmetrically, nontender to palpation. Musculoskeletal:  No clubbing or cyanosis. No redness, warmth, or swelling of the joints. Neurological: Alert, awake. SKIN: No bruises, no bleeding, normal skin color, texture, turgor and no redness, warmth or swelling.     /80   Pulse 70   Resp 16   Ht 5' 9\" (1.753 m)   Wt 178 lb (80.7 kg)   SpO2 100%   BMI 26.29 kg/m²     DATA:  I personally reviewed the visit EKG with the following interpretation:    EKG - 9/1/20  Sinus rhythm, nonspecific T wave changes, occasional PVCs    ECHO: 9/10/20 Summary   Normal mitral valve structure and function. Mild mitral valve regurgitation. Systole dominant flow in the right upper and left upper pulmonary veins. Normal left ventricular chamber size. Visually estimated LVEF is 60-65 %. No wall motion abnormalities. Normal right ventricle structure and function. Dated 8/20/2019 revealed mild mitral and tricuspid insufficiency, mild aortic and pulmonary insufficiency, mild sclerosis of the aortic valve without stenosis, ejection fraction of 57%    11/26/18 Mild mitral valve insufficiency. Mild left atrial enlargement. EF 44% with mild global hypokinesis. Left ventricular diastolic dysfunction    Stress Test: 2/7/19 1. Negative Lexiscan stress test for ischemic symptoms or ischemic EKG changes  2. Abnormal Cardiolite perfusion scan showing severely depressed left ventricular systolic function without any evidence of fixed or reversible defect  3. Severely depressed left ventricular systolic function with global wall hypokinesis  4. There is no comparison study  5.   There is also the study predicts low probability for significant coronary artery disease    Cardiology Labs: BMP:    Lab Results   Component Value Date     04/16/2018    K 4.3 04/16/2018     04/16/2018    CO2 27 04/16/2018    BUN 22 04/16/2018    CREATININE 0.9 04/16/2018     CMP:    Lab Results   Component Value Date     04/16/2018    K 4.3 04/16/2018     04/16/2018    CO2 27 04/16/2018    BUN 22 04/16/2018    CREATININE 0.9 04/16/2018    PROT 7.7 04/16/2018     CBC:    Lab Results   Component Value Date    WBC 7.4 04/16/2018    RBC 5.23 04/16/2018    HGB 15.4 04/16/2018    HCT 46.6 04/16/2018    MCV 89.1 04/16/2018    RDW 13.1 04/16/2018     04/16/2018     PT/INR:  No results found for: PTINR  PT/INR Warfarin:  No components found for: PTPATWAR, PTINRWAR  PTT:  No results found for: APTT  PTT Heparin:  No components found for: APTTHEP  Magnesium:    Lab Results   Component Value Date    MG 2.4 03/02/2015     TSH:    Lab Results   Component Value Date    TSH 1.500 05/19/2017     TROPONIN:  No components found for: TROP  BNP:  No results found for: BNP  FASTING LIPID PANEL:    Lab Results   Component Value Date    CHOL 157 05/19/2017    HDL 47 05/19/2017    TRIG 76 05/19/2017     No orders to display     I have personally reviewed the laboratory, cardiac diagnostic and radiographic testing as outlined above:      IMPRESSION:  1. Mitral valve regurgitation: Moderate on echocardiogram done in July 2021, asymptomatic, will continue current treatment  2. Essential (primary) hypertension: Controlled  3. Ventricular premature depolarization: Chronic  4. Abnormal electrocardiogram EKG: Nonspecific T wave changes  5. Tricuspid valve regurgitation: Mild      RECOMMENDATIONS:   1.  Echocardiogram in 12 years  2. Continue current treatment  3. Preventive cardiology: Low-salt, low-cholesterol diet, daily exercise, were all advised. 4.  Follow-up with Dr. Kiko Pedroza as scheduled  5. Follow-up with Dr. Sergio Love in 1 year, sooner if symptomatic for any reason    I have reviewed my findings and recommendations with patient    Electronically signed by Andrea Pope MD on 12/14/2021 at 10:02 AM   NOTE: This report was transcribed using voice recognition software.  Every effort was made to ensure accuracy; however, inadvertent computerized transcription errors may be present

## 2022-04-13 ENCOUNTER — TELEPHONE (OUTPATIENT)
Dept: AUDIOLOGY | Age: 72
End: 2022-04-13

## 2022-04-13 NOTE — TELEPHONE ENCOUNTER
Received VM from Ema at [facility] facility to confirm that patient's hearing aid is in. Returned call and spoke with Annie Perez. Advised her that hearing aid and mold have been received.

## 2022-04-14 ENCOUNTER — PROCEDURE VISIT (OUTPATIENT)
Dept: AUDIOLOGY | Age: 72
End: 2022-04-14
Payer: MEDICARE

## 2022-04-14 DIAGNOSIS — H90.A22 SENSORINEURAL HEARING LOSS, UNILATERAL, LEFT EAR, WITH RESTRICTED HEARING ON THE CONTRALATERAL SIDE: ICD-10-CM

## 2022-04-14 PROCEDURE — V5257 HEARING AID, DIGIT, MON, BTE: HCPCS | Performed by: AUDIOLOGIST

## 2022-04-14 PROCEDURE — V5241 DISPENSING FEE, MONAURAL: HCPCS | Performed by: AUDIOLOGIST

## 2022-04-14 NOTE — PROGRESS NOTES
Patient seen for hearing aid fitting, of monaural BTE hearing aid. Patient instructed in the use and care of the hearing aid/ear mold. Patient scheduled to return in 30 days for a hearing aid check. Billing, per [facility] approval, completed at today's visit.

## 2022-06-16 ENCOUNTER — PROCEDURE VISIT (OUTPATIENT)
Dept: AUDIOLOGY | Age: 72
End: 2022-06-16

## 2022-06-16 DIAGNOSIS — H90.A22 SENSORINEURAL HEARING LOSS, UNILATERAL, LEFT EAR, WITH RESTRICTED HEARING ON THE CONTRALATERAL SIDE: ICD-10-CM

## 2022-06-16 PROCEDURE — 99999 PR OFFICE/OUTPT VISIT,PROCEDURE ONLY: CPT | Performed by: AUDIOLOGIST

## 2022-06-16 NOTE — PROGRESS NOTES
Patient was seen for 30 day hearing aid check. Patient requested volume to be turned down. Necessary adjustments made in Phon[employer] and patient was satisfied with result. Reminded patient to notify nursing staff if any issues. MARIA DEL CARMEN Thomas.    Orion Allen CCC/SHAMEKA  Audiologist  [id number]  NPI#:  [id number]  Electronically signed by Marisel Lehman on 6/16/2022 at 8:25 AM

## 2023-01-31 ENCOUNTER — OFFICE VISIT (OUTPATIENT)
Dept: CARDIOLOGY CLINIC | Age: 73
End: 2023-01-31
Payer: MEDICARE

## 2023-01-31 VITALS
BODY MASS INDEX: 26.36 KG/M2 | SYSTOLIC BLOOD PRESSURE: 136 MMHG | DIASTOLIC BLOOD PRESSURE: 78 MMHG | RESPIRATION RATE: 16 BRPM | HEIGHT: 69 IN | WEIGHT: 178 LBS | HEART RATE: 50 BPM

## 2023-01-31 DIAGNOSIS — I38 VHD (VALVULAR HEART DISEASE): Primary | ICD-10-CM

## 2023-01-31 PROCEDURE — 3078F DIAST BP <80 MM HG: CPT | Performed by: INTERNAL MEDICINE

## 2023-01-31 PROCEDURE — 3075F SYST BP GE 130 - 139MM HG: CPT | Performed by: INTERNAL MEDICINE

## 2023-01-31 PROCEDURE — G8427 DOCREV CUR MEDS BY ELIG CLIN: HCPCS | Performed by: INTERNAL MEDICINE

## 2023-01-31 PROCEDURE — 1123F ACP DISCUSS/DSCN MKR DOCD: CPT | Performed by: INTERNAL MEDICINE

## 2023-01-31 PROCEDURE — G8484 FLU IMMUNIZE NO ADMIN: HCPCS | Performed by: INTERNAL MEDICINE

## 2023-01-31 PROCEDURE — G8417 CALC BMI ABV UP PARAM F/U: HCPCS | Performed by: INTERNAL MEDICINE

## 2023-01-31 PROCEDURE — 93000 ELECTROCARDIOGRAM COMPLETE: CPT | Performed by: INTERNAL MEDICINE

## 2023-01-31 PROCEDURE — 99214 OFFICE O/P EST MOD 30 MIN: CPT | Performed by: INTERNAL MEDICINE

## 2023-01-31 PROCEDURE — 1036F TOBACCO NON-USER: CPT | Performed by: INTERNAL MEDICINE

## 2023-01-31 PROCEDURE — 3017F COLORECTAL CA SCREEN DOC REV: CPT | Performed by: INTERNAL MEDICINE

## 2023-01-31 RX ORDER — PANTOPRAZOLE SODIUM 40 MG/1
TABLET, DELAYED RELEASE ORAL
COMMUNITY
Start: 2023-01-27

## 2023-01-31 NOTE — PROGRESS NOTES
[facility] Cardiology Progress Note  Dr. Jesse Martinez      Referring Physician: Melanie Reyes DO  CHIEF COMPLAINT:   Chief Complaint   Patient presents with    Heart Problem     Annual        HISTORY OF PRESENT ILLNESS:   Patient is 9year old male with history of mitral valve regurgitation, PVCs, abnormal EKG and hypertension, patient had a recent echocardiogram by Dr. Dominga Sanders office, was found to have marked mitral valve regurgitation compared to mild mitral valve regurgitation and an echocardiogram done last year, patient is here for further evaluation    Patient denies any chest pain, no shortness of breath, no lightheadedness, no dizziness, no palpitations, no pedal edema, no PND, no orthopnea, no syncope, no presyncopal episodes.   Functional capacity is good for age       Past Medical History:   Diagnosis Date    Abnormal electrocardiogram (ECG) (EKG)     Arthritis     BPH (benign prostatic hyperplasia)     Cardiomyopathy, unspecified (HCC)     Chronic low back pain     lumbar herniated disc    Gastritis     with erosive esophagitis    GERD (gastroesophageal reflux disease)     History of cardiovascular stress test 6/2015    lexiscan    [place] (hard of hearing)     Hypertension     Internal hemorrhoids     Macular degeneration     Mental deficiency     Mitral valve insufficiency 07/24/2020    marked    Moderate tricuspid insufficiency 07/24/2020    Osteopenia     Partial small bowel obstruction ([address] 3/2/2015    PVC (premature ventricular contraction)     Trigeminal neuralgia     right eye    Ventricular premature depolarization          Past Surgical History:   Procedure Laterality Date    CARPAL TUNNEL RELEASE  3/16/2009    left    CHOLECYSTECTOMY  05/24/2017    lap    ENDOSCOPY, COLON, DIAGNOSTIC  4-    ANTEGRADE BALLOON ENTEROSCOPY    EYE SURGERY  12/9/2010    right eye, left 1/13/2011    OTHER SURGICAL HISTORY  03/02/2015    diagnostic laparascopy; egd with biopsey partial omentectomy TRANSESOPHAGEAL ECHOCARDIOGRAM N/A 9/10/2020    TRANSESOPHAGEAL ECHOCARDIOGRAM performed by Tony Montague MD at [facility]         Current Outpatient Medications   Medication Sig Dispense Refill    pantoprazole (PROTONIX) 40 MG tablet       triamcinolone (KENALOG) 0.1 % ointment Apply topically 2 times daily Apply topically 2 times daily. hydrocortisone (ANUSOL-HC) 2.5 % CREA rectal cream Place rectally 2 times daily      fluticasone (FLONASE) 50 MCG/ACT nasal spray 2 sprays by Nasal route daily       sucralfate (CARAFATE) 1 GM tablet Take 1 g by mouth 4 times daily       CALCIUM 600/VITAMIN D3 600-800 MG-UNIT TABS Take by mouth 2 times daily   11    Multiple Vitamins-Minerals (PRESERVISION AREDS 2) CAPS Take 1 capsule by mouth Daily   12    hydrochlorothiazide (MICROZIDE) 12.5 MG capsule Take 12.5 mg by mouth every morning   12    Lidocaine-Hydrocortisone Ace 2-2 % KIT Place rectally daily as needed      diphenhydrAMINE (BENADRYL) 50 MG capsule Take 50 mg by mouth every 6 hours as needed for Itching       metoprolol (LOPRESSOR) 25 MG tablet Take 1 tablet by mouth 2 times daily. 60 tablet 0    tamsulosin (FLOMAX) 0.4 MG capsule Take 1 capsule by mouth daily. 30 capsule 0    loratadine (CLARITIN) 10 MG tablet Take 10 mg by mouth daily. meloxicam (MOBIC) 15 MG tablet Take 15 mg by mouth daily. Calcium Polycarbophil (FIBER) 625 MG TABS Take 1 tablet by mouth 2 times daily (with meals). esomeprazole (NEXIUM) 40 MG delayed release capsule Take 40 mg by mouth every morning (before breakfast)  (Patient not taking: Reported on 1/31/2023)  12    clobetasol (TEMOVATE) 0.05 % cream Apply topically 2 times daily as needed Apply topically 2 times daily. (Patient not taking: Reported on 1/31/2023)       No current facility-administered medications for this visit.          Allergies as of 01/31/2023 - Fully Reviewed 01/31/2023   Allergen Reaction Noted    Bee venom Swelling 03/02/2015       Social History Socioeconomic History    Marital status: Single     Spouse name: Not on file    Number of children: Not on file    Years of education: Not on file    Highest education level: Not on file   Occupational History    Not on file   Tobacco Use    Smoking status: Former     Types: Cigarettes     Quit date: 1995     Years since quittin.6    Smokeless tobacco: Never   Vaping Use    Vaping Use: Never used   Substance and Sexual Activity    Alcohol use: No     Comment: No caffeine     Drug use: No    Sexual activity: Not Currently   Other Topics Concern    Not on file   Social History Narrative    Not on file     Social Determinants of Health     Financial Resource Strain: Not on file   Food Insecurity: Not on file   Transportation Needs: Not on file   Physical Activity: Not on file   Stress: Not on file   Social Connections: Not on file   Intimate Partner Violence: Not on file   Housing Stability: Not on file     No family history for early CAD    REVIEW OF SYSTEMS:     CONSTITUTIONAL:  negative for  fevers, chills, sweats and fatigue  HEENT:  negative for  tinnitus, earaches, nasal congestion and epistaxis  RESPIRATORY:  negative for  dry cough, cough with sputum, wheezing and hemoptysis  GASTROINTESTINAL:  negative for nausea, vomiting, diarrhea, constipation, pruritus and jaundice  HEMATOLOGIC/LYMPHATIC:  negative for easy bruising, bleeding, lymphadenopathy and petechiae  ENDOCRINE:  negative for heat intolerance, cold intolerance, tremor, hair loss and diabetic symptoms including neither polyuria nor polydipsia nor blurred vision  MUSCULOSKELETAL:  negative for  myalgias, arthralgias, joint swelling, stiff joints and decreased range of motion  NEUROLOGICAL:  negative for memory problems, speech problems, visual disturbance, dysphagia, weakness and numbness      PHYSICAL EXAM:   Constitutional:  Awake, alert cooperative, no apparent distress, and appears stated age.   HEENT:  Moist and pink mucous membranes, normocephalic, without obvious abnormality, atraumatic, normal ears and nose. NECK:  Supple, symmetrical, trachea midline, no JVD, no adenopathy, thyroid symmetric, not enlarged and no tenderness, good carotid upstroke bilaterally, no carotid bruit, skin normal.   LUNGS: No increased work of breathing, good air exchange, clear to auscultation bilaterally, no crackles or wheezing. Cardiovascular: Normal apical impulse, regular rate and rhythm, normal S1 and S2, no S3 or S4, 2/6 systolic murmur at the apex, 2/6 systolic murmur at the left lower sternal border, no pedal edema, good carotid upstroke bilaterally, no carotid bruit, no JVD, no abdominal pulsating masses. ABDOMEN: Soft, nontender, no hepatomegaly, no splenomegaly, bowel sound positive. CHEST:  Expands symmetrically, nontender to palpation. Musculoskeletal:  No clubbing or cyanosis. No redness, warmth, or swelling of the joints. Neurological: Alert, awake. SKIN: No bruises, no bleeding, normal skin color, texture, turgor and no redness, warmth or swelling. /78   Pulse 50   Resp 16   Ht 5' 9\" (1.753 m)   Wt 178 lb (80.7 kg)   BMI 26.29 kg/m²     DATA:  I personally reviewed the visit EKG with the following interpretation: Sinus bradycardia, baseline artifacts, nonspecific T wave changes     EKG - 9/1/20  Sinus rhythm, nonspecific T wave changes, occasional PVCs    ECHO: 9/10/20 Summary   Normal mitral valve structure and function. Mild mitral valve regurgitation. Systole dominant flow in the right upper and left upper pulmonary veins. Normal left ventricular chamber size. Visually estimated LVEF is 60-65 %. No wall motion abnormalities. Normal right ventricle structure and function. Dated 8/20/2019 revealed mild mitral and tricuspid insufficiency, mild aortic and pulmonary insufficiency, mild sclerosis of the aortic valve without stenosis, ejection fraction of 57%    11/26/18 Mild mitral valve insufficiency.   Mild left atrial enlargement. EF 44% with mild global hypokinesis. Left ventricular diastolic dysfunction    Stress Test: 2/7/19 1. Negative Lexiscan stress test for ischemic symptoms or ischemic EKG changes  2. Abnormal Cardiolite perfusion scan showing severely depressed left ventricular systolic function without any evidence of fixed or reversible defect  3. Severely depressed left ventricular systolic function with global wall hypokinesis  4. There is no comparison study  5.   There is also the study predicts low probability for significant coronary artery disease    Cardiology Labs: BMP:    Lab Results   Component Value Date/Time     04/16/2018 08:45 AM    K 4.3 04/16/2018 08:45 AM     04/16/2018 08:45 AM    CO2 27 04/16/2018 08:45 AM    BUN 22 04/16/2018 08:45 AM    CREATININE 0.9 04/16/2018 08:45 AM     CMP:    Lab Results   Component Value Date/Time     04/16/2018 08:45 AM    K 4.3 04/16/2018 08:45 AM     04/16/2018 08:45 AM    CO2 27 04/16/2018 08:45 AM    BUN 22 04/16/2018 08:45 AM    CREATININE 0.9 04/16/2018 08:45 AM    PROT 7.7 04/16/2018 08:45 AM     CBC:    Lab Results   Component Value Date/Time    WBC 7.4 04/16/2018 08:45 AM    RBC 5.23 04/16/2018 08:45 AM    HGB 15.4 04/16/2018 08:45 AM    HCT 46.6 04/16/2018 08:45 AM    MCV 89.1 04/16/2018 08:45 AM    RDW 13.1 04/16/2018 08:45 AM     04/16/2018 08:45 AM     PT/INR:  No results found for: PTINR  PT/INR Warfarin:  No components found for: PTPATWAR, PTINRWAR  PTT:  No results found for: APTT  PTT Heparin:  No components found for: APTTHEP  Magnesium:    Lab Results   Component Value Date/Time    MG 2.4 03/02/2015 01:40 AM     TSH:    Lab Results   Component Value Date/Time    TSH 1.500 05/19/2017 09:00 AM     TROPONIN:  No components found for: TROP  BNP:  No results found for: BNP  FASTING LIPID PANEL:    Lab Results   Component Value Date/Time    CHOL 157 05/19/2017 09:00 AM    HDL 47 05/19/2017 09:00 AM    TRIG 76 05/19/2017 09:00 AM     No orders to display     I have personally reviewed the laboratory, cardiac diagnostic and radiographic testing as outlined above:  Blood work-up from Dr. Melanie Menjivar office done in July 2002, reviewed, reviewed the unremarkable CMP unremarkable CBC    IMPRESSION:  1. Mitral valve regurgitation: Moderate on echocardiogram done in July 2021, repeat echocardiogram done at Dr. Melanie Menjivar office in July 2020 revealed mild mitral valve regurgitation, will continue current treatmentasymptomatic, will continue current treatment  2. Aortic valve regurgitation: Mild  3. Tricuspid valve regurgitation: Mild  4. Essential (primary) hypertension: Controlled  5. Ventricular premature depolarization: Chronic  6. Abnormal electrocardiogram EKG: Nonspecific T wave changes  7. Tricuspid valve regurgitation: Mild      RECOMMENDATIONS:   1. Continue current treatment  2. Preventive cardiology: Low-salt, low-cholesterol diet, daily exercise, were all advised. 3.  Follow-up with Dr. Melanie Menjivar as scheduled  4. Follow-up with Dr. Marina Jhaveri in 1 year, sooner if symptomatic for any reason    I have reviewed my findings and recommendations with patient    Electronically signed by Jarrell Angela MD on 1/31/2023 at 1:01 PM   NOTE: This report was transcribed using voice recognition software.  Every effort was made to ensure accuracy; however, inadvertent computerized transcription errors may be present

## 2023-07-11 ENCOUNTER — PROCEDURE VISIT (OUTPATIENT)
Dept: AUDIOLOGY | Age: 73
End: 2023-07-11

## 2023-07-11 DIAGNOSIS — H90.3 SENSORINEURAL HEARING LOSS, BILATERAL: Primary | ICD-10-CM

## 2023-07-11 PROCEDURE — 99024 POSTOP FOLLOW-UP VISIT: CPT | Performed by: AUDIOLOGIST

## 2023-07-11 NOTE — PROGRESS NOTES
Hearing aid tubing changed. No other issues noted.     Sherri Friend, NORMA/A  Audiologist  [id number]  NPI#:  [id number]

## 2024-02-06 ENCOUNTER — OFFICE VISIT (OUTPATIENT)
Dept: CARDIOLOGY CLINIC | Age: 74
End: 2024-02-06
Payer: MEDICARE

## 2024-02-06 VITALS
BODY MASS INDEX: 25.33 KG/M2 | SYSTOLIC BLOOD PRESSURE: 128 MMHG | HEIGHT: 69 IN | WEIGHT: 171 LBS | RESPIRATION RATE: 16 BRPM | HEART RATE: 55 BPM | DIASTOLIC BLOOD PRESSURE: 84 MMHG

## 2024-02-06 DIAGNOSIS — I38 VHD (VALVULAR HEART DISEASE): Primary | ICD-10-CM

## 2024-02-06 PROCEDURE — 99214 OFFICE O/P EST MOD 30 MIN: CPT | Performed by: INTERNAL MEDICINE

## 2024-02-06 PROCEDURE — G8427 DOCREV CUR MEDS BY ELIG CLIN: HCPCS | Performed by: INTERNAL MEDICINE

## 2024-02-06 PROCEDURE — G8484 FLU IMMUNIZE NO ADMIN: HCPCS | Performed by: INTERNAL MEDICINE

## 2024-02-06 PROCEDURE — G8419 CALC BMI OUT NRM PARAM NOF/U: HCPCS | Performed by: INTERNAL MEDICINE

## 2024-02-06 PROCEDURE — 1123F ACP DISCUSS/DSCN MKR DOCD: CPT | Performed by: INTERNAL MEDICINE

## 2024-02-06 PROCEDURE — 3017F COLORECTAL CA SCREEN DOC REV: CPT | Performed by: INTERNAL MEDICINE

## 2024-02-06 PROCEDURE — 3074F SYST BP LT 130 MM HG: CPT | Performed by: INTERNAL MEDICINE

## 2024-02-06 PROCEDURE — 1036F TOBACCO NON-USER: CPT | Performed by: INTERNAL MEDICINE

## 2024-02-06 PROCEDURE — 3079F DIAST BP 80-89 MM HG: CPT | Performed by: INTERNAL MEDICINE

## 2024-02-06 PROCEDURE — 93000 ELECTROCARDIOGRAM COMPLETE: CPT | Performed by: INTERNAL MEDICINE

## 2024-02-06 NOTE — PROGRESS NOTES
[facility] Progress Note  Dr. Leonard Roberts      Referring Physician: Jordi Matthews Jr.,   CHIEF COMPLAINT:   Chief Complaint   Patient presents with    Hypertension     Annual        HISTORY OF PRESENT ILLNESS:   Patient is 73 year old male with history of mitral valve regurgitation, PVCs, abnormal EKG and hypertension, is here for follow-up appointment patient denies any chest pain, no shortness of breath, no lightheadedness, no dizziness, no palpitations, no pedal edema, no PND, no orthopnea, no syncope, no presyncopal episodes.  Functional capacity is good for age     Past Medical History:   Diagnosis Date    Abnormal electrocardiogram (ECG) (EKG)     Arthritis     BPH (benign prostatic hyperplasia)     Cardiomyopathy, unspecified (HCC)     Chronic low back pain     lumbar herniated disc    Gastritis     with erosive esophagitis    GERD (gastroesophageal reflux disease)     History of cardiovascular stress test 6/2015    lexiscan    [place] (hard of hearing)     Hypertension     Internal hemorrhoids     Macular degeneration     Mental deficiency     Mitral valve insufficiency 07/24/2020    marked    Moderate tricuspid insufficiency 07/24/2020    Osteopenia     Partial small bowel obstruction (HCC) 3/2/2015    PVC (premature ventricular contraction)     Trigeminal neuralgia     right eye    Ventricular premature depolarization          Past Surgical History:   Procedure Laterality Date    CARPAL TUNNEL RELEASE  3/16/2009    left    CHOLECYSTECTOMY  05/24/2017    lap    ENDOSCOPY, COLON, DIAGNOSTIC  4-    ANTEGRADE BALLOON ENTEROSCOPY    EYE SURGERY  12/9/2010    right eye, left 1/13/2011    OTHER SURGICAL HISTORY  03/02/2015    diagnostic laparascopy; egd with biopsey partial omentectomy    TRANSESOPHAGEAL ECHOCARDIOGRAM N/A 9/10/2020    TRANSESOPHAGEAL ECHOCARDIOGRAM performed by Johny Khan MD at [facility] ENDOSCOPY         Current Outpatient Medications   Medication Sig Dispense Refill

## 2024-10-07 ENCOUNTER — TELEPHONE (OUTPATIENT)
Dept: AUDIOLOGY | Age: 74
End: 2024-10-07

## 2024-10-07 NOTE — TELEPHONE ENCOUNTER
Called [facility] and stated that hearing aids are ready to be picked up. [place] [facility]ing changed and hearing aid clean/checked.  at home stated that they will come pick hearing aid up when they have someone out this way. Pickup form with hearing aid at .    Electronically signed by Marisel Winter on 10/7/2024 at 3:34 PM

## 2025-08-25 ENCOUNTER — PROCEDURE VISIT (OUTPATIENT)
Dept: PHYSICAL MEDICINE AND REHAB | Age: 75
End: 2025-08-25

## 2025-08-25 VITALS — BODY MASS INDEX: 23.19 KG/M2 | WEIGHT: 162 LBS | HEIGHT: 70 IN

## 2025-08-25 DIAGNOSIS — G56.12 MEDIAN NEUROPATHY, LEFT: ICD-10-CM

## 2025-08-25 DIAGNOSIS — G56.22 ULNAR NEUROPATHY AT ELBOW, LEFT: Primary | ICD-10-CM

## (undated) DEVICE — MARKER,SKIN,WI/RULER AND LABELS: Brand: MEDLINE

## (undated) DEVICE — TOWEL,OR,DSP,ST,BLUE,STD,6/PK,12PK/CS: Brand: MEDLINE

## (undated) DEVICE — COVER,LIGHT HANDLE,FLX,1/PK: Brand: MEDLINE INDUSTRIES, INC.

## (undated) DEVICE — GOWN,SIRUS,NONRNF,SETINSLV,XL,20/CS: Brand: MEDLINE